# Patient Record
Sex: MALE | Race: WHITE
[De-identification: names, ages, dates, MRNs, and addresses within clinical notes are randomized per-mention and may not be internally consistent; named-entity substitution may affect disease eponyms.]

---

## 2017-10-24 ENCOUNTER — HOSPITAL ENCOUNTER (INPATIENT)
Dept: HOSPITAL 10 - REC | Age: 67
LOS: 1 days | Discharge: HOME HEALTH | DRG: 470 | End: 2017-10-25
Payer: COMMERCIAL

## 2017-10-24 VITALS — SYSTOLIC BLOOD PRESSURE: 99 MMHG | DIASTOLIC BLOOD PRESSURE: 56 MMHG | HEART RATE: 81 BPM | RESPIRATION RATE: 16 BRPM

## 2017-10-24 VITALS — DIASTOLIC BLOOD PRESSURE: 58 MMHG | HEART RATE: 78 BPM | SYSTOLIC BLOOD PRESSURE: 105 MMHG | RESPIRATION RATE: 12 BRPM

## 2017-10-24 VITALS — SYSTOLIC BLOOD PRESSURE: 110 MMHG | DIASTOLIC BLOOD PRESSURE: 62 MMHG | RESPIRATION RATE: 15 BRPM | HEART RATE: 80 BPM

## 2017-10-24 VITALS — HEART RATE: 80 BPM | RESPIRATION RATE: 12 BRPM | SYSTOLIC BLOOD PRESSURE: 102 MMHG | DIASTOLIC BLOOD PRESSURE: 58 MMHG

## 2017-10-24 VITALS — HEART RATE: 80 BPM | RESPIRATION RATE: 12 BRPM | SYSTOLIC BLOOD PRESSURE: 108 MMHG | DIASTOLIC BLOOD PRESSURE: 54 MMHG

## 2017-10-24 VITALS — RESPIRATION RATE: 11 BRPM | HEART RATE: 82 BPM | SYSTOLIC BLOOD PRESSURE: 102 MMHG | DIASTOLIC BLOOD PRESSURE: 54 MMHG

## 2017-10-24 VITALS — HEART RATE: 85 BPM | DIASTOLIC BLOOD PRESSURE: 64 MMHG | RESPIRATION RATE: 17 BRPM | SYSTOLIC BLOOD PRESSURE: 115 MMHG

## 2017-10-24 VITALS — HEART RATE: 78 BPM | DIASTOLIC BLOOD PRESSURE: 50 MMHG | RESPIRATION RATE: 11 BRPM | SYSTOLIC BLOOD PRESSURE: 106 MMHG

## 2017-10-24 VITALS — DIASTOLIC BLOOD PRESSURE: 50 MMHG | RESPIRATION RATE: 15 BRPM | SYSTOLIC BLOOD PRESSURE: 100 MMHG | HEART RATE: 79 BPM

## 2017-10-24 VITALS — DIASTOLIC BLOOD PRESSURE: 54 MMHG | SYSTOLIC BLOOD PRESSURE: 104 MMHG | HEART RATE: 82 BPM | RESPIRATION RATE: 17 BRPM

## 2017-10-24 VITALS — RESPIRATION RATE: 17 BRPM | HEART RATE: 87 BPM | SYSTOLIC BLOOD PRESSURE: 116 MMHG | DIASTOLIC BLOOD PRESSURE: 58 MMHG

## 2017-10-24 VITALS — RESPIRATION RATE: 17 BRPM | SYSTOLIC BLOOD PRESSURE: 117 MMHG | DIASTOLIC BLOOD PRESSURE: 53 MMHG | HEART RATE: 87 BPM

## 2017-10-24 VITALS
WEIGHT: 202.16 LBS | BODY MASS INDEX: 27.68 KG/M2 | BODY MASS INDEX: 27.68 KG/M2 | BODY MASS INDEX: 27.68 KG/M2 | HEIGHT: 71.5 IN | HEIGHT: 71.5 IN | WEIGHT: 202.16 LBS

## 2017-10-24 VITALS — DIASTOLIC BLOOD PRESSURE: 62 MMHG | SYSTOLIC BLOOD PRESSURE: 110 MMHG | HEART RATE: 80 BPM | RESPIRATION RATE: 13 BRPM

## 2017-10-24 VITALS — DIASTOLIC BLOOD PRESSURE: 56 MMHG | SYSTOLIC BLOOD PRESSURE: 107 MMHG | RESPIRATION RATE: 11 BRPM | HEART RATE: 82 BPM

## 2017-10-24 VITALS — HEART RATE: 90 BPM | DIASTOLIC BLOOD PRESSURE: 63 MMHG | SYSTOLIC BLOOD PRESSURE: 124 MMHG | RESPIRATION RATE: 17 BRPM

## 2017-10-24 VITALS — HEART RATE: 82 BPM | DIASTOLIC BLOOD PRESSURE: 56 MMHG | SYSTOLIC BLOOD PRESSURE: 114 MMHG | RESPIRATION RATE: 17 BRPM

## 2017-10-24 VITALS — SYSTOLIC BLOOD PRESSURE: 99 MMHG | RESPIRATION RATE: 12 BRPM | DIASTOLIC BLOOD PRESSURE: 53 MMHG | HEART RATE: 80 BPM

## 2017-10-24 VITALS — RESPIRATION RATE: 12 BRPM | DIASTOLIC BLOOD PRESSURE: 57 MMHG | HEART RATE: 80 BPM | SYSTOLIC BLOOD PRESSURE: 106 MMHG

## 2017-10-24 VITALS — SYSTOLIC BLOOD PRESSURE: 104 MMHG | DIASTOLIC BLOOD PRESSURE: 57 MMHG | HEART RATE: 80 BPM | RESPIRATION RATE: 13 BRPM

## 2017-10-24 VITALS — RESPIRATION RATE: 17 BRPM | DIASTOLIC BLOOD PRESSURE: 59 MMHG | SYSTOLIC BLOOD PRESSURE: 117 MMHG | HEART RATE: 87 BPM

## 2017-10-24 VITALS — RESPIRATION RATE: 13 BRPM | SYSTOLIC BLOOD PRESSURE: 102 MMHG | DIASTOLIC BLOOD PRESSURE: 55 MMHG | HEART RATE: 82 BPM

## 2017-10-24 VITALS — DIASTOLIC BLOOD PRESSURE: 65 MMHG | SYSTOLIC BLOOD PRESSURE: 120 MMHG | RESPIRATION RATE: 20 BRPM

## 2017-10-24 VITALS — SYSTOLIC BLOOD PRESSURE: 125 MMHG | DIASTOLIC BLOOD PRESSURE: 66 MMHG | HEART RATE: 92 BPM | RESPIRATION RATE: 17 BRPM

## 2017-10-24 VITALS — RESPIRATION RATE: 18 BRPM | SYSTOLIC BLOOD PRESSURE: 100 MMHG | DIASTOLIC BLOOD PRESSURE: 56 MMHG

## 2017-10-24 VITALS — RESPIRATION RATE: 16 BRPM | SYSTOLIC BLOOD PRESSURE: 122 MMHG | HEART RATE: 70 BPM | DIASTOLIC BLOOD PRESSURE: 73 MMHG

## 2017-10-24 VITALS — SYSTOLIC BLOOD PRESSURE: 105 MMHG | DIASTOLIC BLOOD PRESSURE: 51 MMHG | RESPIRATION RATE: 12 BRPM | HEART RATE: 80 BPM

## 2017-10-24 VITALS — DIASTOLIC BLOOD PRESSURE: 64 MMHG | SYSTOLIC BLOOD PRESSURE: 112 MMHG | RESPIRATION RATE: 17 BRPM | HEART RATE: 90 BPM

## 2017-10-24 VITALS — RESPIRATION RATE: 15 BRPM | DIASTOLIC BLOOD PRESSURE: 50 MMHG | HEART RATE: 80 BPM | SYSTOLIC BLOOD PRESSURE: 105 MMHG

## 2017-10-24 DIAGNOSIS — D69.6: ICD-10-CM

## 2017-10-24 DIAGNOSIS — M17.12: Primary | ICD-10-CM

## 2017-10-24 PROCEDURE — 83735 ASSAY OF MAGNESIUM: CPT

## 2017-10-24 PROCEDURE — 97116 GAIT TRAINING THERAPY: CPT

## 2017-10-24 PROCEDURE — 87086 URINE CULTURE/COLONY COUNT: CPT

## 2017-10-24 PROCEDURE — 85025 COMPLETE CBC W/AUTO DIFF WBC: CPT

## 2017-10-24 PROCEDURE — 86850 RBC ANTIBODY SCREEN: CPT

## 2017-10-24 PROCEDURE — 97161 PT EVAL LOW COMPLEX 20 MIN: CPT

## 2017-10-24 PROCEDURE — 80053 COMPREHEN METABOLIC PANEL: CPT

## 2017-10-24 PROCEDURE — 82306 VITAMIN D 25 HYDROXY: CPT

## 2017-10-24 PROCEDURE — 86901 BLOOD TYPING SEROLOGIC RH(D): CPT

## 2017-10-24 PROCEDURE — 73560 X-RAY EXAM OF KNEE 1 OR 2: CPT

## 2017-10-24 PROCEDURE — 87081 CULTURE SCREEN ONLY: CPT

## 2017-10-24 PROCEDURE — 86900 BLOOD TYPING SEROLOGIC ABO: CPT

## 2017-10-24 PROCEDURE — 0SRD0JZ REPLACEMENT OF LEFT KNEE JOINT WITH SYNTHETIC SUBSTITUTE, OPEN APPROACH: ICD-10-PCS

## 2017-10-24 PROCEDURE — 84443 ASSAY THYROID STIM HORMONE: CPT

## 2017-10-24 PROCEDURE — 88304 TISSUE EXAM BY PATHOLOGIST: CPT

## 2017-10-24 PROCEDURE — 97166 OT EVAL MOD COMPLEX 45 MIN: CPT

## 2017-10-24 PROCEDURE — 88311 DECALCIFY TISSUE: CPT

## 2017-10-24 PROCEDURE — 84100 ASSAY OF PHOSPHORUS: CPT

## 2017-10-24 PROCEDURE — C1776 JOINT DEVICE (IMPLANTABLE): HCPCS

## 2017-10-24 RX ADMIN — Medication SCH MLS/HR: at 14:15

## 2017-10-24 RX ADMIN — DEXAMETHASONE SODIUM PHOSPHATE SCH MG: 10 INJECTION, SOLUTION INTRAMUSCULAR; INTRAVENOUS at 20:13

## 2017-10-24 RX ADMIN — CEFAZOLIN SCH MLS/HR: 1 INJECTION, POWDER, FOR SOLUTION INTRAMUSCULAR; INTRAVENOUS at 14:11

## 2017-10-24 RX ADMIN — TRANEXAMIC ACID SCH: 100 INJECTION, SOLUTION INTRAVENOUS at 13:25

## 2017-10-24 RX ADMIN — CEFAZOLIN SCH MLS/HR: 1 INJECTION, POWDER, FOR SOLUTION INTRAMUSCULAR; INTRAVENOUS at 21:43

## 2017-10-24 RX ADMIN — Medication SCH MLS/HR: at 21:10

## 2017-10-24 RX ADMIN — DEXAMETHASONE SODIUM PHOSPHATE SCH MG: 10 INJECTION, SOLUTION INTRAMUSCULAR; INTRAVENOUS at 14:11

## 2017-10-24 RX ADMIN — TRANEXAMIC ACID SCH: 100 INJECTION, SOLUTION INTRAVENOUS at 12:38

## 2017-10-24 RX ADMIN — POTASSIUM CHLORIDE SCH MLS/HR: 2 INJECTION, SOLUTION, CONCENTRATE INTRAVENOUS at 15:40

## 2017-10-24 NOTE — PN
Date/Time of Note


Date/Time of Note


DATE: 10/24/17 


TIME: 20:17





Assessment/Plan


VTE Prophylaxis


VTE Prophylaxis Intervention:  other (Aspirin)





Lines/Catheters


IV Catheter Type (from Nrsg):  Peripheral IV


Urinary Cath still in place:  Yes


Reason Cath still needed:  urinary retention





Assessment/Plan


Chief Complaint/Hosp Course


Subjective: No distress.  Pain controlled





Objective: Vital signs stable





Physical exam


No pallor adenopathy


Regular


Clear


Bowel sounds present nontender nondistended no rigidity or rebound guarding


Left knee dressed mild edema





Assessment/plan


1.  Postop day 0 left total knee arthroplasty.  Stable treat pain, 

anticoagulation per Ortho


2.  DJD


Problems:  





Exam/Review of Systems


Vital Signs


Vitals





 Vital Signs








  Date Time  Temp Pulse Resp B/P Pulse Ox O2 Delivery O2 Flow Rate FiO2


 


10/24/17 20:13 98.6 87 20 120/65 98   


 


10/24/17 18:00      Venturi Mask 2.0 











Medications


Medications





 Current Medications


Dextrose/Lactated Ringer's (D5-Lr) 1,000 ml @  80 mls/hr [id number] IV  Last 

administered on 10/24/17at 15:40; Admin Dose 80 MLS/HR;  Start 10/24/17 at 13:07


Hydromorphone HCl (Dilaudid PCA)  Q4PCA  PRN IV SEVERE PAIN 8-10;  Start 10/24/

17 at 13:30;  Stop 10/25/17 at 13:29


Meperidine HCl (Demerol PCA)  Q4PCA  PRN IV SEVERE PAIN 8-10;  Start 10/24/17 

at 13:30;  Stop 10/25/17 at 13:29


Oxycodone HCl (Roxicodone) 20 mg Q3H  PRN PO PAIN LEVEL 8-10;  Start 10/24/17 

at 13:30


Oxycodone HCl (Roxicodone) 10 mg Q3H  PRN PO PAIN LEVEL 4-7;  Start 10/24/17 at 

13:30


Oxycodone HCl 5 mg 5 mg Q3H  PRN PO PAIN LEVEL 1-3 Last administered on 10/24/

17at 16:53; Admin Dose 5 MG;  Start 10/24/17 at 13:30


Acetaminophen (Ofirmev 1000mg/ 100ml Iv) 100 ml @  400 mls/hr Q8H IVPB  Last 

administered on 10/24/17at 14:15; Admin Dose 400 MLS/HR;  Start 10/24/17 at 13:

30;  Stop 10/26/17 at 05:44


Zolpidem Tartrate (Ambien) 5 mg HS  PRN PO INSOMNIA;  Start 10/24/17 at 13:30


Ondansetron HCl 4 mg 4 mg Q6H IV  Last administered on 10/24/17at 14:11; Admin 

Dose 4 MG;  Start 10/24/17 at 13:30;  Stop 10/25/17 at 07:31


Cefazolin Sodium (Ancef 1 Gm/50 ml (Pmx)) 50 ml @  100 mls/hr Q8H IVPB  Last 

administered on 10/24/17at 14:11; Admin Dose 100 MLS/HR;  Start 10/24/17 at 13:

30;  Stop 10/25/17 at 05:59


Miscellaneous Information (Note)  NOTE XX ;  Start 10/24/17 at 13:30


Aspirin (Ecotrin) 325 mg DAILY PO ;  Start 10/25/17 at 09:00


Celecoxib (Celebrex) 100 mg BID PO ;  Start 10/25/17 at 09:00


Dexamethasone (Decadron) 4 mg DAILY@07 IV ;  Start 10/25/17 at 07:00;  Stop 10/

28/17 at 06:59


Pantoprazole (Protonix Tab) 40 mg DAILY@06 PO ;  Start 10/26/17 at 06:00


Docusate Sodium/ Ferrous Fumarate (Lorenzo-Sequels) 1 tab BID PO ;  Start 10/25/

17 at 09:00


Docusate Sodium (Colace) 200 mg BID PO ;  Start 10/25/17 at 09:00;  Stop 10/28/

17 at 08:59


Simethicone (Mylicon) 80 mg TID  PRN PO DISTENSION/GAS/BLOATING;  Start 10/24/

17 at 13:30


Senna/Docusate Sodium (Senokot-S) 2 tab BID  PRN PO CONSTIPATION;  Start 10/24/

17 at 13:30


Magnesium Hydroxide (Milk Of Mag) 30 ml HS  PRN PO CONSTIPATION;  Start 10/24/

17 at 13:30


Bisacodyl (Dulcolax Supp) 10 mg DAILY  PRN [place] CONSTIPATION;  Start 10/24/17 at 

13:30


Sodium Biphosphate/ Sodium Phosphate (Fleet Enema) 133 ml DAILY  PRN AL 

CONSTIPATION;  Start 10/24/17 at 13:30


Diphenhydramine HCl (Benadryl) 25 mg Q4H  PRN IM ITCHING OR RASH;  Start 10/24/

17 at 13:30


Ketorolac Tromethamine (Toradol) 15 mg DAILY@06  PRN IV PAIN;  Start 10/25/17 

at 06:00;  Stop 10/29/17 at 05:59


Bupivacaine HCl/ Epinephrine Bitart (Marcaine 0.25%/ Epi (Sdv) 30 ml) 20 ml 

DAILY@06  PRN INJ ADMINSTER BY SURGEON ONLY;  Start 10/25/17 at 06:00;  Stop 10/

29/17 at 05:59


Naloxone HCl (Narcan) 0.2 mg Q2M PRN IV DECREASED REPIRATORY RATE;  Start 10/24/

17 at 13:30











ROGER NARVAEZ MD Oct 24, 2017 20:25

## 2017-10-24 NOTE — SIPON
Date/Time of Note


Date/Time of Note


DATE: 10/24/17 


TIME: 12:45





Operative Report


Preoperative Diagnosis


left knee djd


Postoperative Diagnosis


SAME


Operation/Procedure Performed


Left total knee replacement


Surgeon


see signature line


First assist


Dr. Chip Broderick


Second assist:  SOLE COLÓN PA-C


Anesthesia:  spinal


Estimated blood loss:  150 - 200 ml's


Transfusion Required


   none


Specimen


bone


Grafts/Implants


DePuy 7 femur, 8 tibia, 12 poly, 38 patella


Complications


none











CALEB QUINN Oct 24, 2017 12:48

## 2017-10-24 NOTE — RADRPT
PROCEDURE:   LEFT KNEE X-RAY 

 

CLINICAL INDICATION:   Postop

 

TECHNIQUE:   Two views of the right knee were obtained. 

 

COMPARISON:   None 

 

FINDINGS:

The patient is status left knee hardware placement. There is near anatomic alignment .  There are po
stsurgical changes in the subcutaneous soft tissues. There is normal mineralization.  No acute fract
ure or dislocation is seen.  

 

IMPRESSION:

 

1. Postsurgical changes of the knee status post hardware placement, with near anatomic alignment.

 

RPTAT: AARR

_____________________________________________ 

Physician Ayesha           Date    Time 

Electronically viewed and signed by Physician Ayesha on 10/24/2017 15:28 

 

D:  10/24/2017 15:28  T:  10/24/2017 15:28

LORETO/

## 2017-10-24 NOTE — HPN
Date/Time of Note


Date/Time of Note


DATE: 10/24/17 


TIME: 10:31





Interval H&P Admission Note


Pt. seen H&P reviewed:  No system changes











SOLE COLÓN PA-C Oct 24, 2017 10:31

## 2017-10-24 NOTE — OPR
Date/Time of Note


Date/Time of Note


DATE: 10/24/17 


TIME: 17:51





Operative Report


Procedure Date:  Oct 24, 2017


Preoperative Diagnosis


left knee DJD


Postoperative Diagnosis


same


Operation/Procedure Performed


left total knee replacement


Surgeon


see signature line


Assistant


Dr. Chip Broderick


Second Assistant:  SOLE COLÓN PA-C


Anesthesia Type:  spinal


Estimated Blood Loss:  150 - 200 ml's


Transfusion


   none


Specimen


bone


Grafts/Implants


DePuy 7 femur, 8 tibia, 38 patella, 12 poly


Tubes/Drains


none


Complications


none


Pt Condition Post Procedure:  stable


Indications


67-year-old male with severe osteoarthritis of his left knee and no response to 

conservative treatment for 1 year


Procedure Description


The patient was placed supine on the operating room table.  The left knee was 

prepped and draped in the usual manner.  Examination of the left knee under 

anesthesia showed a flexion deformity of 10 and a varus deformity of 10.  An 

anterior approach to the left knee was made.  A mid vastus approach was made 

and the patella displaced laterally without everting it.  Medial and lateral 

collateral ligaments were protected.  Using intramedullary alignment, the 

distal femoral cut was made in 5 of valgus.  The femur was prepared for a size 

7 component from the Depuy knee system.  The notch was cut in the distal femur 

to accommodate the posterior stabilized femoral component.  Osteophytes were 

removed.  The PCL was sacrificed and remnants of the menisci were removed.  The 

tibia was cut using external alignment and the patella cut using a freehand 

technique.  Trials were inserted including a 7 femur, 8 tibia and a 38 patella.

  10 mm of polyethylene resulted in full range of motion with mild laxity.  12 

mm of polyethylene were needed to create a stable knee from 0-120 with good 

balance and tracking.  Trials were then removed.  Antibiotic cement was mixed 

and final components cemented in place.  Excess cement was removed and the knee 

injected with a combination of Marcaine and Toradol.  The knee was closed in 

layers using #1 Vicryl for arthrotomy fascia, 2-0 Vicryl for subcutaneous 

tissue and 3-0 Monocryl for the skin.  Patient was transferred to the recovery 

room in stable condition.











CALEB QUINN Oct 24, 2017 17:56

## 2017-10-24 NOTE — PDOCDIS
Discharge Instructions


DIAGNOSIS


Discharge Diagnosis


Status post left total knee arthroplasty





CONDITION


Patient Condition:  Good





HOME CARE INSTRUCTIONS:


Diet Instructions:  Regular





ACTIVITY:








Activity Restrictions:   Slowly Increase Activity





 Rest between Activity





 Avoid heavy lifting





 No Sexual Activity





 Do not Drive





 Do not operate Machinery





 Do not operate Power Tool





 Avoid Heavy Housework





 Keep Limb Elevated (No pillows under the leg.  You may apply pillows under the 

ankle to keep the leg in full extension while lying down.)





 Weight Bearing (Weight-bear as tolerated using front wheeled walker.)





Bathing Restrictions:  Shower (Mepilex should remain on until postoperative 

visit.  Do not rub Mepilex dressing off.)





FOLLOW UP/APPOINTMENTS


Follow-up Plan


Follow-up 10-14 days status post surgery at postoperative appointment given to 

you at your preoperative visit.











SOLE COLÓN PA-C Oct 24, 2017 13:15

## 2017-10-25 VITALS — DIASTOLIC BLOOD PRESSURE: 59 MMHG | RESPIRATION RATE: 20 BRPM | SYSTOLIC BLOOD PRESSURE: 99 MMHG

## 2017-10-25 VITALS — DIASTOLIC BLOOD PRESSURE: 61 MMHG | SYSTOLIC BLOOD PRESSURE: 116 MMHG | RESPIRATION RATE: 20 BRPM

## 2017-10-25 LAB
ABNORMAL IP MESSAGE: 1
ALBUMIN SERPL-MCNC: 2.9 G/DL (ref 3.3–4.9)
ALBUMIN/GLOB SERPL: 1.16 {RATIO}
ALP SERPL-CCNC: 42 IU/L (ref 42–121)
ALT SERPL-CCNC: 32 IU/L (ref 13–69)
ANION GAP SERPL CALC-SCNC: 6 MMOL/L (ref 8–16)
AST SERPL-CCNC: 18 IU/L (ref 15–46)
BASOPHILS # BLD AUTO: 0 10^3/UL (ref 0–0.1)
BASOPHILS NFR BLD: 0.1 % (ref 0–2)
BILIRUB DIRECT SERPL-MCNC: 0 MG/DL (ref 0–0.2)
BILIRUB SERPL-MCNC: 0.5 MG/DL (ref 0.2–1.3)
BUN SERPL-MCNC: 18 MG/DL (ref 7–20)
CALCIUM SERPL-MCNC: 8.5 MG/DL (ref 8.4–10.2)
CHLORIDE SERPL-SCNC: 108 MMOL/L (ref 97–110)
CO2 SERPL-SCNC: 30 MMOL/L (ref 21–31)
CREAT SERPL-MCNC: 1.22 MG/DL (ref 0.61–1.24)
EOSINOPHIL # BLD: 0 10^3/UL (ref 0–0.5)
EOSINOPHIL NFR BLD: 0 % (ref 0–7)
ERYTHROCYTE [DISTWIDTH] IN BLOOD BY AUTOMATED COUNT: 12.5 % (ref 11.5–14.5)
GLOBULIN SER-MCNC: 2.5 G/DL (ref 1.3–3.2)
GLUCOSE SERPL-MCNC: 104 MG/DL (ref 70–220)
HCT VFR BLD CALC: 33.2 % (ref 42–52)
HGB BLD-MCNC: 11 G/DL (ref 14–18)
LYMPHOCYTES # BLD AUTO: 1 10^3/UL (ref 0.8–2.9)
LYMPHOCYTES NFR BLD AUTO: 14.6 % (ref 15–51)
MAGNESIUM SERPL-MCNC: 1.7 MG/DL (ref 1.7–2.5)
MCH RBC QN AUTO: 31.3 PG (ref 29–33)
MCHC RBC AUTO-ENTMCNC: 33.1 G/DL (ref 32–37)
MCV RBC AUTO: 94.6 FL (ref 82–101)
MONOCYTES # BLD: 0.7 10^3/UL (ref 0.3–0.9)
MONOCYTES NFR BLD: 9.4 % (ref 0–11)
NEUTROPHILS # BLD: 5.2 10^3/UL (ref 1.6–7.5)
NEUTROPHILS NFR BLD AUTO: 75.5 % (ref 39–77)
NRBC # BLD MANUAL: 0 10^3/UL (ref 0–0)
NRBC BLD AUTO-RTO: 0 /100WBC (ref 0–0)
PHOSPHATE SERPL-MCNC: 3.5 MG/DL (ref 2.5–4.9)
PLATELET # BLD: 87 10^3/UL (ref 140–415)
PMV BLD AUTO: 12 FL (ref 7.4–10.4)
POSITIVE DIFF: (no result)
POTASSIUM SERPL-SCNC: 4.2 MMOL/L (ref 3.5–5.1)
PROT SERPL-MCNC: 5.4 G/DL (ref 6.1–8.1)
RBC # BLD AUTO: 3.51 10^6/UL (ref 4.7–6.1)
SODIUM SERPL-SCNC: 140 MMOL/L (ref 135–144)
TSH SERPL-ACNC: 0.6 MIU/L (ref 0.47–4.68)
WBC # BLD AUTO: 6.9 10^3/UL (ref 4.8–10.8)

## 2017-10-25 RX ADMIN — CEFAZOLIN SCH MLS/HR: 1 INJECTION, POWDER, FOR SOLUTION INTRAMUSCULAR; INTRAVENOUS at 06:07

## 2017-10-25 RX ADMIN — Medication SCH MLS/HR: at 14:11

## 2017-10-25 RX ADMIN — Medication SCH MLS/HR: at 05:37

## 2017-10-25 RX ADMIN — DEXAMETHASONE SODIUM PHOSPHATE SCH MG: 10 INJECTION, SOLUTION INTRAMUSCULAR; INTRAVENOUS at 07:30

## 2017-10-25 RX ADMIN — POTASSIUM CHLORIDE SCH MLS/HR: 2 INJECTION, SOLUTION, CONCENTRATE INTRAVENOUS at 14:07

## 2017-10-25 RX ADMIN — POTASSIUM CHLORIDE SCH MLS/HR: 2 INJECTION, SOLUTION, CONCENTRATE INTRAVENOUS at 01:37

## 2017-10-25 RX ADMIN — DEXAMETHASONE SODIUM PHOSPHATE SCH MG: 10 INJECTION, SOLUTION INTRAMUSCULAR; INTRAVENOUS at 01:30

## 2017-10-25 NOTE — PN
Date/Time of Note


Date/Time of Note


DATE: 10/25/17 


TIME: 08:54





24 hour Interval Summary


POD#1 s/p Left TKA





Subjective:


Patient doing well


No acute events overnight


Pain is well controlled


Dressing: clean, dry, and intact, no erythema


Sensation intact to light touch in a sural, saphenous, deep peroneal, 

superficial


peroneal, medial and lateral plantar nerve distribution. Motor is intact, 

patient able to


dorsiflex and plantarflex ankle and extend and flex great toe. Dorsalis Pedis 

pulse +2,


Brisk capillary refill. Compartments are soft. Calves non tender to palpation 

bilaterally.








Antibiotics:


Ancef x24 hr from surgery





Assesment and Plan:


POD#1 s/p Left TKA.  Doing well.





-Post op H&H stable


-Chronic thrombocytopenia.


-PT/OT


-Joints pain control protocol


-DVT prophylaxis: SCD's and ASA


-Weight bearing status: as tolerated


-Abx: 24h vanc/ancef


-Diet: ADAT


-Dailey: Dced


-Discharge planning consult


Planned Discharge Date: 10/25/17


Discharge to home





Physical Exam





 Vital Signs








  Date Time  Temp Pulse Resp B/P Pulse Ox O2 Delivery O2 Flow Rate FiO2


 


10/25/17 07:37 97.6 61 20 99/59 96   


 


10/24/17 20:10      Nasal Cannula 2.0 














 Intake and Output   


 


 10/24/17 10/24/17 10/25/17





 15:00 23:00 07:00


 


Intake Total 220 ml 1288 ml 1140 ml


 


Output Total 900 ml  1250 ml


 


Balance -680 ml 1288 ml -110 ml











VTE Prophylaxis


VTE Prophylaxis Intervention:  ambulation, SCD's, other (ASA)





Lines/Catheters


IV Catheter Type:  Saline Lock


Dailey in Place:  No





Results


Result Diagram:  


10/25/17 0431                                                                  

              10/25/17 0431





Results 24hrs





Laboratory Tests








Test


  10/25/17


04:31


 


White Blood Count 6.9  


 


Red Blood Count 3.51  L


 


Hemoglobin 11.0  L


 


Hematocrit 33.2  L


 


Mean Corpuscular Volume 94.6  


 


Mean Corpuscular Hemoglobin 31.3  


 


Mean Corpuscular Hemoglobin


Concent 33.1  


 


 


Red Cell Distribution Width 12.5  


 


Platelet Count 87  L


 


Mean Platelet Volume 12.0  H


 


Neutrophils % 75.5  


 


Lymphocytes % 14.6  L


 


Monocytes % 9.4  


 


Eosinophils % 0.0  


 


Basophils % 0.1  


 


Nucleated Red Blood Cells % 0.0  


 


Neutrophils # 5.2  


 


Lymphocytes # 1.0  


 


Monocytes # 0.7  


 


Eosinophils # 0.0  


 


Basophils # 0.0  


 


Nucleated Red Blood Cells # 0.0  


 


Sodium Level 140  


 


Potassium Level 4.2  


 


Chloride Level 108  


 


Carbon Dioxide Level 30  


 


Anion Gap 6  L


 


Blood Urea Nitrogen 18  


 


Creatinine 1.22  


 


Glucose Level 104  


 


Calcium Level 8.5  


 


Phosphorus Level 3.5  


 


Magnesium Level 1.7  


 


Total Bilirubin 0.5  


 


Direct Bilirubin 0.00  


 


Indirect Bilirubin 0.5  


 


Aspartate Amino Transf


(AST/SGOT) 18  


 


 


Alanine Aminotransferase


(ALT/SGPT) 32  


 


 


Alkaline Phosphatase 42  


 


Total Protein 5.4  L


 


Albumin 2.9  L


 


Globulin 2.50  


 


Albumin/Globulin Ratio 1.16  


 


Thyroid Stimulating Hormone


(TSH) 0.598  


 











Medications


Medications


Home Meds


Reported Medications


Alendronate Sodium* (Fosamax*) 70 Mg Tablet, 70 MG PO EVERY THURDAY, #4 TAB


   10/24/17


Etodolac (Etodolac) 400 Mg Tablet, 400 MG PO BID, TAB


   10/24/17


Tamsulosin Hcl* (Tamsulosin Hcl*) 0.4 Mg Cap.er.24h, 0.4 MG PO HS, CAP


   10/23/17


Duloxetine Hcl* (Cymbalta*) 60 Mg Capsule.dr, 60 MG PO DAILY, CAP


   10/23/17











JESSIE WATSON MD Oct 25, 2017 09:04

## 2017-10-25 NOTE — CONS
Date/Time of Note


Date/Time of Note


DATE: 10/25/17 


TIME: 11:42





Consult Date/Type/Reason


Admit Date/Time


Oct 24, 2017 at 07:54


Initial Consult Date








Subjective


Seen by orthopedic surgery team today, no acute events overnight.





Objective





 Vital Signs








  Date Time  Temp Pulse Resp B/P Pulse Ox O2 Delivery O2 Flow Rate FiO2


 


10/25/17 07:37 97.6 61 20 99/59 96   


 


10/24/17 20:10      Nasal Cannula 2.0 














 Intake and Output   


 


 10/24/17 10/24/17 10/25/17





 15:00 23:00 07:00


 


Intake Total 220 ml 1288 ml 1140 ml


 


Output Total 900 ml  1250 ml


 


Balance -680 ml 1288 ml -110 ml








Exam


No pallor adenopathy


Regular


Clear


Bowel sounds present nontender nondistended no rigidity or rebound guarding


Left knee dressed mild edema





Results/Medications


Result Diagram:  


10/25/17 0431                                                                  

              10/25/17 0431





Results 24 hrs





Laboratory Tests








Test


  10/25/17


04:31


 


White Blood Count 6.9  


 


Red Blood Count 3.51  L


 


Hemoglobin 11.0  L


 


Hematocrit 33.2  L


 


Mean Corpuscular Volume 94.6  


 


Mean Corpuscular Hemoglobin 31.3  


 


Mean Corpuscular Hemoglobin


Concent 33.1  


 


 


Red Cell Distribution Width 12.5  


 


Platelet Count 87  L


 


Mean Platelet Volume 12.0  H


 


Neutrophils % 75.5  


 


Lymphocytes % 14.6  L


 


Monocytes % 9.4  


 


Eosinophils % 0.0  


 


Basophils % 0.1  


 


Nucleated Red Blood Cells % 0.0  


 


Neutrophils # 5.2  


 


Lymphocytes # 1.0  


 


Monocytes # 0.7  


 


Eosinophils # 0.0  


 


Basophils # 0.0  


 


Nucleated Red Blood Cells # 0.0  


 


Sodium Level 140  


 


Potassium Level 4.2  


 


Chloride Level 108  


 


Carbon Dioxide Level 30  


 


Anion Gap 6  L


 


Blood Urea Nitrogen 18  


 


Creatinine 1.22  


 


Glucose Level 104  


 


Calcium Level 8.5  


 


Phosphorus Level 3.5  


 


Magnesium Level 1.7  


 


Total Bilirubin 0.5  


 


Direct Bilirubin 0.00  


 


Indirect Bilirubin 0.5  


 


Aspartate Amino Transf


(AST/SGOT) 18  


 


 


Alanine Aminotransferase


(ALT/SGPT) 32  


 


 


Alkaline Phosphatase 42  


 


Total Protein 5.4  L


 


Albumin 2.9  L


 


Globulin 2.50  


 


Albumin/Globulin Ratio 1.16  


 


Thyroid Stimulating Hormone


(TSH) 0.598  


 








Medications





 Current Medications


Dextrose/Lactated Ringer's (D5-Lr) 1,000 ml @  80 mls/hr [id number] IV  Last 

administered on 10/24/17at 15:40; Admin Dose 80 MLS/HR;  Start 10/24/17 at 13:07


Hydromorphone HCl (Dilaudid PCA)  Q4PCA  PRN IV SEVERE PAIN 8-10;  Start 10/24/

17 at 13:30;  Stop 10/25/17 at 13:29


Meperidine HCl (Demerol PCA)  Q4PCA  PRN IV SEVERE PAIN 8-10;  Start 10/24/17 

at 13:30;  Stop 10/25/17 at 13:29


Oxycodone HCl (Roxicodone) 20 mg Q3H  PRN PO PAIN LEVEL 8-10;  Start 10/24/17 

at 13:30


Oxycodone HCl (Roxicodone) 10 mg Q3H  PRN PO PAIN LEVEL 4-7 Last administered 

on 10/25/17at 09:30; Admin Dose 10 MG;  Start 10/24/17 at 13:30


Oxycodone HCl 5 mg 5 mg Q3H  PRN PO PAIN LEVEL 1-3 Last administered on 10/24/

17at 16:53; Admin Dose 5 MG;  Start 10/24/17 at 13:30


Acetaminophen (Ofirmev 1000mg/ 100ml Iv) 100 ml @  400 mls/hr Q8H IVPB  Last 

administered on 10/25/17at 05:37; Admin Dose 400 MLS/HR;  Start 10/24/17 at 13:

30;  Stop 10/26/17 at 05:44


Zolpidem Tartrate (Ambien) 5 mg HS  PRN PO INSOMNIA Last administered on 10/24/

17at 20:12; Admin Dose 5 MG;  Start 10/24/17 at 13:30


Miscellaneous Information (Note)  NOTE XX ;  Start 10/24/17 at 13:30


Aspirin (Ecotrin) 325 mg DAILY PO  Last administered on 10/25/17at 09:29; Admin 

Dose 325 MG;  Start 10/25/17 at 09:00


Celecoxib (Celebrex) 100 mg BID PO ;  Start 10/25/17 at 09:00


Dexamethasone (Decadron) 4 mg DAILY@07 IV  Last administered on 10/25/17at 06:37

; Admin Dose 4 MG;  Start 10/25/17 at 07:00;  Stop 10/28/17 at 06:59


Pantoprazole (Protonix Tab) 40 mg DAILY@06 PO ;  Start 10/26/17 at 06:00


Docusate Sodium/ Ferrous Fumarate (Lorenzo-Sequels) 1 tab BID PO  Last 

administered on 10/25/17at 09:30; Admin Dose 1 TAB;  Start 10/25/17 at 09:00


Docusate Sodium (Colace) 200 mg BID PO  Last administered on 10/25/17at 09:29; 

Admin Dose 200 MG;  Start 10/25/17 at 09:00;  Stop 10/28/17 at 08:59


Simethicone (Mylicon) 80 mg TID  PRN PO DISTENSION/GAS/BLOATING;  Start 10/24/

17 at 13:30


Senna/Docusate Sodium (Senokot-S) 2 tab BID  PRN PO CONSTIPATION;  Start 10/24/

17 at 13:30


Magnesium Hydroxide (Milk Of Mag) 30 ml HS  PRN PO CONSTIPATION;  Start 10/24/

17 at 13:30


Bisacodyl (Dulcolax Supp) 10 mg DAILY  PRN [place] CONSTIPATION;  Start 10/24/17 at 

13:30


Sodium Biphosphate/ Sodium Phosphate (Fleet Enema) 133 ml DAILY  PRN [place] 

CONSTIPATION;  Start 10/24/17 at 13:30


Diphenhydramine HCl (Benadryl) 25 mg Q4H  PRN IM ITCHING OR RASH;  Start 10/24/

17 at 13:30


Ketorolac Tromethamine (Toradol) 15 mg DAILY@06  PRN IV PAIN;  Start 10/25/17 

at 06:00;  Stop 10/29/17 at 05:59


Bupivacaine HCl/ Epinephrine Bitart (Marcaine 0.25%/ Epi (Sdv) 30 ml) 20 ml 

DAILY@06  PRN INJ ADMINSTER BY SURGEON ONLY;  Start 10/25/17 at 06:00;  Stop 10/

29/17 at 05:59


Naloxone HCl (Narcan) 0.2 mg Q2M PRN IV DECREASED REPIRATORY RATE;  Start 10/24/

17 at 13:30





Assessment/Plan


Chief Complaint/Hosp Course


Assessment/plan


1.  Postop day 1 left total knee arthroplasty.  


   -Stable, continue physical therapy, treat pain, anticoagulation per Ortho


   -Likely discharge home later this afternoon


2.  DJD


Problems:  











HARSHA BARTLETT Oct 25, 2017 11:45

## 2017-10-26 NOTE — DS
Date/Time of Note


Date/Time of Note


DATE: 10/26/17 


TIME: 07:54





Discharge Summary


Admission/Discharge Info


Admit Date/Time


Oct 24, 2017 at 07:54


Discharge Date/Time


Oct 25, 2017 at 17:30


Discharge Diagnosis


Status post left total knee arthroplasty


Patient Condition:  Good


Hospital Course


On the day of admission, the patient underwent left total knee arthroplasty





Intraoperative complications: None





Postoperative complications: None





The patient was given prophylactic antibiotics and anticoagulants.





On the day of surgery and first postoperative day patient was started on gait 

training and was taught usual restrictions following knee replacement





On postoperative day 1 dressing was clean dry and intact.  No complications 

were observed.





On the day of discharge, the wound was clean and healing well; there was no 

sign of infection.  Wound care instructions were discussed with the patient.





Discharge Temperature: 97.5


Discharge White Blood Cell Count: 6.9


Discharge Hemoglobin: 11





The patient was discharged home with home health.  Arrangements were made for 

visiting nurses and home health/physical therapy. 





The patient will be seen in office at scheduled postoperative evaluation date 

given on their preoperative exam.  Should patient complain of any problems 

prior to scheduled postoperative evaluation date, they may call into outpatient 

clinic to determine if they need to  be scheduled at sooner appointment to be 

seen immediately if needed.





Discharge medications: As per medication reconciliation form





Diet: Same as preadmission diet.





This is Sole Yanez PA-C dictating discharge summary for [].


Home Meds


Reported Medications


Alendronate Sodium* (Fosamax*) 70 Mg Tablet, 70 MG PO EVERY THURDAY, #4 TAB


   10/24/17


Etodolac (Etodolac) 400 Mg Tablet, 400 MG PO BID, TAB


   10/24/17


Tamsulosin Hcl* (Tamsulosin Hcl*) 0.4 Mg Cap.er.24h, 0.4 MG PO HS, CAP


   10/23/17


Duloxetine Hcl* (Cymbalta*) 60 Mg Capsule.dr, 60 MG PO DAILY, CAP


   10/23/17


Follow-up Plan


Follow-up 10-14 days status post surgery at postoperative appointment given to 

you at your preoperative visit.


Primary Care Provider


Care Physician No Primary











SOLE COLÓN PA-C Oct 26, 2017 07:56

## 2018-09-13 ENCOUNTER — HOSPITAL ENCOUNTER (INPATIENT)
Age: 68
LOS: 1 days | Discharge: HOME | DRG: 470 | End: 2018-09-14

## 2018-09-13 ENCOUNTER — HOSPITAL ENCOUNTER (INPATIENT)
Dept: HOSPITAL 91 - REC | Age: 68
LOS: 1 days | Discharge: HOME | DRG: 470 | End: 2018-09-14
Payer: COMMERCIAL

## 2018-09-13 DIAGNOSIS — F33.9: ICD-10-CM

## 2018-09-13 DIAGNOSIS — M17.11: Primary | ICD-10-CM

## 2018-09-13 DIAGNOSIS — N40.0: ICD-10-CM

## 2018-09-13 DIAGNOSIS — Z85.72: ICD-10-CM

## 2018-09-13 DIAGNOSIS — D64.9: ICD-10-CM

## 2018-09-13 DIAGNOSIS — D69.6: ICD-10-CM

## 2018-09-13 PROCEDURE — 80048 BASIC METABOLIC PNL TOTAL CA: CPT

## 2018-09-13 PROCEDURE — 97530 THERAPEUTIC ACTIVITIES: CPT

## 2018-09-13 PROCEDURE — 86850 RBC ANTIBODY SCREEN: CPT

## 2018-09-13 PROCEDURE — 88304 TISSUE EXAM BY PATHOLOGIST: CPT

## 2018-09-13 PROCEDURE — 87081 CULTURE SCREEN ONLY: CPT

## 2018-09-13 PROCEDURE — 97535 SELF CARE MNGMENT TRAINING: CPT

## 2018-09-13 PROCEDURE — 97161 PT EVAL LOW COMPLEX 20 MIN: CPT

## 2018-09-13 PROCEDURE — 71045 X-RAY EXAM CHEST 1 VIEW: CPT

## 2018-09-13 PROCEDURE — 0SRC0J9 REPLACEMENT OF RIGHT KNEE JOINT WITH SYNTHETIC SUBSTITUTE, CEMENTED, OPEN APPROACH: ICD-10-PCS

## 2018-09-13 PROCEDURE — 86900 BLOOD TYPING SEROLOGIC ABO: CPT

## 2018-09-13 PROCEDURE — 97116 GAIT TRAINING THERAPY: CPT

## 2018-09-13 PROCEDURE — 86901 BLOOD TYPING SEROLOGIC RH(D): CPT

## 2018-09-13 PROCEDURE — 97166 OT EVAL MOD COMPLEX 45 MIN: CPT

## 2018-09-13 PROCEDURE — 85025 COMPLETE CBC W/AUTO DIFF WBC: CPT

## 2018-09-13 PROCEDURE — 73560 X-RAY EXAM OF KNEE 1 OR 2: CPT

## 2018-09-13 RX ADMIN — GABAPENTIN 1 MG: 100 CAPSULE ORAL at 15:58

## 2018-09-13 RX ADMIN — THIAMINE HYDROCHLORIDE 1 MLS/HR: 100 INJECTION, SOLUTION INTRAMUSCULAR; INTRAVENOUS at 20:03

## 2018-09-13 RX ADMIN — CEFAZOLIN SODIUM 1 MLS/HR: 2 SOLUTION INTRAVENOUS at 09:12

## 2018-09-13 RX ADMIN — GABAPENTIN 1 MG: 100 CAPSULE ORAL at 20:17

## 2018-09-13 RX ADMIN — DOCUSATE SODIUM 1 MG: 100 CAPSULE, LIQUID FILLED ORAL at 08:00

## 2018-09-13 RX ADMIN — THIAMINE HYDROCHLORIDE 1 MLS/HR: 100 INJECTION, SOLUTION INTRAMUSCULAR; INTRAVENOUS at 23:02

## 2018-09-13 RX ADMIN — CELECOXIB 1 MG: 100 CAPSULE ORAL at 12:00

## 2018-09-13 RX ADMIN — ONDANSETRON HYDROCHLORIDE 1 MG: 2 INJECTION, SOLUTION INTRAMUSCULAR; INTRAVENOUS at 14:00

## 2018-09-13 RX ADMIN — CEFAZOLIN 1 MLS/HR: 1 INJECTION, POWDER, FOR SOLUTION INTRAMUSCULAR; INTRAVENOUS at 18:20

## 2018-09-13 RX ADMIN — ONDANSETRON HYDROCHLORIDE 1 MG: 2 INJECTION, SOLUTION INTRAMUSCULAR; INTRAVENOUS at 15:59

## 2018-09-13 RX ADMIN — BACITRACIN 1 UNITS: 50000 INJECTION, POWDER, FOR SOLUTION INTRAMUSCULAR at 09:48

## 2018-09-13 RX ADMIN — ASPIRIN 1 MG: 325 TABLET, DELAYED RELEASE ORAL at 08:00

## 2018-09-13 RX ADMIN — TRANEXAMIC ACID 1 MLS/HR: 100 INJECTION, SOLUTION INTRAVENOUS at 11:00

## 2018-09-13 RX ADMIN — THIAMINE HYDROCHLORIDE 1 MLS/HR: 100 INJECTION, SOLUTION INTRAMUSCULAR; INTRAVENOUS at 13:45

## 2018-09-13 RX ADMIN — CELECOXIB 1 MG: 100 CAPSULE ORAL at 21:00

## 2018-09-13 RX ADMIN — ONDANSETRON HYDROCHLORIDE 1 MG: 2 INJECTION, SOLUTION INTRAMUSCULAR; INTRAVENOUS at 20:00

## 2018-09-13 RX ADMIN — POLYMYXIN B SULFATE 1 UNIT: 500000 INJECTION, POWDER, LYOPHILIZED, FOR SOLUTION INTRAMUSCULAR; INTRATHECAL; INTRAVENOUS; OPHTHALMIC at 09:48

## 2018-09-14 LAB
ABNORMAL IP MESSAGE: 1
ADD MAN DIFF?: NO
ANION GAP: 10 (ref 8–16)
BASOPHIL #: 0 10^3/UL (ref 0–0.1)
BASOPHILS %: 0.1 % (ref 0–2)
BLOOD UREA NITROGEN: 15 MG/DL (ref 7–20)
CALCIUM: 8.6 MG/DL (ref 8.4–10.2)
CARBON DIOXIDE: 29 MMOL/L (ref 21–31)
CHLORIDE: 105 MMOL/L (ref 97–110)
CREATININE: 0.96 MG/DL (ref 0.61–1.24)
EOSINOPHILS #: 0 10^3/UL (ref 0–0.5)
EOSINOPHILS %: 0 % (ref 0–7)
GLUCOSE: 100 MG/DL (ref 70–220)
HEMATOCRIT: 34.1 % (ref 42–52)
HEMOGLOBIN: 11.4 G/DL (ref 14–18)
IMMATURE GRANS #M: 0.02 10^3/UL (ref 0–0.03)
IMMATURE GRANS % (M): 0.3 % (ref 0–0.43)
LYMPHOCYTES #: 1 10^3/UL (ref 0.8–2.9)
LYMPHOCYTES %: 13.7 % (ref 15–51)
MEAN CORPUSCULAR HEMOGLOBIN: 31.6 PG (ref 29–33)
MEAN CORPUSCULAR HGB CONC: 33.4 G/DL (ref 32–37)
MEAN CORPUSCULAR VOLUME: 94.5 FL (ref 82–101)
MEAN PLATELET VOLUME: 12.7 FL (ref 7.4–10.4)
MONOCYTE #: 0.6 10^3/UL (ref 0.3–0.9)
MONOCYTES %: 8.4 % (ref 0–11)
NEUTROPHIL #: 5.8 10^3/UL (ref 1.6–7.5)
NEUTROPHILS %: 77.5 % (ref 39–77)
NUCLEATED RED BLOOD CELLS #: 0 10^3/UL (ref 0–0)
NUCLEATED RED BLOOD CELLS%: 0 /100WBC (ref 0–0)
PLATELET COUNT: 86 10^3/UL (ref 140–415)
POSITIVE DIFF: (no result)
POTASSIUM: 4.5 MMOL/L (ref 3.5–5.1)
RED BLOOD COUNT: 3.61 10^6/UL (ref 4.7–6.1)
RED CELL DISTRIBUTION WIDTH: 12 % (ref 11.5–14.5)
SODIUM: 139 MMOL/L (ref 135–144)
WHITE BLOOD COUNT: 7.4 10^3/UL (ref 4.8–10.8)

## 2018-09-14 RX ADMIN — ASPIRIN 1 MG: 325 TABLET, DELAYED RELEASE ORAL at 08:17

## 2018-09-14 RX ADMIN — HYDROCODONE BITARTRATE AND ACETAMINOPHEN 1 TAB: 5; 325 TABLET ORAL at 12:01

## 2018-09-14 RX ADMIN — GABAPENTIN 1 MG: 100 CAPSULE ORAL at 08:17

## 2018-09-14 RX ADMIN — DOCUSATE SODIUM 1 MG: 100 CAPSULE, LIQUID FILLED ORAL at 08:17

## 2018-09-14 RX ADMIN — CELECOXIB 1 MG: 100 CAPSULE ORAL at 08:16

## 2018-09-14 RX ADMIN — Medication 1 TAB: at 08:17

## 2018-09-14 RX ADMIN — CEFAZOLIN 1 MLS/HR: 1 INJECTION, POWDER, FOR SOLUTION INTRAMUSCULAR; INTRAVENOUS at 11:21

## 2018-09-14 RX ADMIN — CEFAZOLIN 1 MLS/HR: 1 INJECTION, POWDER, FOR SOLUTION INTRAMUSCULAR; INTRAVENOUS at 02:49

## 2018-09-14 RX ADMIN — ONDANSETRON HYDROCHLORIDE 1 MG: 2 INJECTION, SOLUTION INTRAMUSCULAR; INTRAVENOUS at 02:00

## 2018-09-14 RX ADMIN — THIAMINE HYDROCHLORIDE 1 MLS/HR: 100 INJECTION, SOLUTION INTRAMUSCULAR; INTRAVENOUS at 08:33

## 2022-10-03 ENCOUNTER — OFFICE (OUTPATIENT)
Dept: URBAN - METROPOLITAN AREA CLINIC 57 | Facility: CLINIC | Age: 72
End: 2022-10-03

## 2022-10-03 VITALS
HEART RATE: 87 BPM | RESPIRATION RATE: 16 BRPM | SYSTOLIC BLOOD PRESSURE: 120 MMHG | TEMPERATURE: 98.1 F | DIASTOLIC BLOOD PRESSURE: 75 MMHG | HEIGHT: 72 IN | WEIGHT: 180 LBS

## 2022-10-03 DIAGNOSIS — K62.4 ANAL STRICTURE: ICD-10-CM

## 2022-10-03 PROCEDURE — 99204 OFFICE O/P NEW MOD 45 MIN: CPT | Performed by: INTERNAL MEDICINE

## 2022-10-03 NOTE — SERVICEHPINOTES
The patient is a 72-year-old gentleman who comes in today accompanied by his wife and his daughter Dr Shha. He has been under the care of Dr. Brady. He describes problems dating to being a teenager of difficulty passing bowel movements. He hasn't had a colonoscopy approximately 15 years ago which apparently was extremely painful. Nonetheless over the past 5 or 6 years his symptoms have gotten worse. He has been using MiraLAX in order to have a bowel movement. However when he passes a bowel movement he has extreme pain. He has had a fissure identified in the past that was treated with nifedipine. He has also had an attempt at Botox injection which has not helped. Because of his extreme pain with passing bowel movements he has dramatically limited his oral intake. With this he has lost over 50 pounds. He does not remember having problems as a young child. Recent laboratory data is essentially normal except for platelet count of 121.  He describes a recent negative Cologuard.